# Patient Record
Sex: FEMALE | Race: WHITE | NOT HISPANIC OR LATINO | Employment: PART TIME | URBAN - METROPOLITAN AREA
[De-identification: names, ages, dates, MRNs, and addresses within clinical notes are randomized per-mention and may not be internally consistent; named-entity substitution may affect disease eponyms.]

---

## 2017-01-10 PROBLEM — I65.29 ASYMPTOMATIC CAROTID ARTERY STENOSIS WITHOUT INFARCTION: Status: ACTIVE | Noted: 2017-01-10

## 2017-02-20 PROBLEM — J30.1 SEASONAL ALLERGIC RHINITIS DUE TO POLLEN: Status: ACTIVE | Noted: 2017-02-20

## 2017-03-17 PROBLEM — N39.46 MIXED STRESS AND URGE URINARY INCONTINENCE: Status: ACTIVE | Noted: 2017-03-17

## 2017-10-10 PROBLEM — A41.9 SEVERE SEPSIS: Status: ACTIVE | Noted: 2017-10-10

## 2017-10-10 PROBLEM — R65.20 SEVERE SEPSIS: Status: ACTIVE | Noted: 2017-10-10

## 2017-10-10 PROBLEM — J18.9 BILATERAL PNEUMONIA: Status: ACTIVE | Noted: 2017-10-10

## 2017-10-10 PROBLEM — J80 ARDS (ADULT RESPIRATORY DISTRESS SYNDROME): Status: ACTIVE | Noted: 2017-10-10

## 2017-10-11 PROBLEM — L89.312 DECUBITUS ULCER OF RIGHT BUTTOCK, STAGE 2: Status: ACTIVE | Noted: 2017-10-11

## 2017-10-13 PROBLEM — K85.81 OTHER ACUTE PANCREATITIS WITH UNINFECTED NECROSIS: Status: ACTIVE | Noted: 2017-10-13

## 2017-10-15 PROBLEM — K85.81 OTHER ACUTE PANCREATITIS WITH UNINFECTED NECROSIS: Status: ACTIVE | Noted: 2017-10-15

## 2017-10-16 PROBLEM — R06.02 SOB (SHORTNESS OF BREATH): Status: ACTIVE | Noted: 2017-10-16

## 2017-10-17 PROBLEM — R41.0 DELIRIUM: Status: ACTIVE | Noted: 2017-10-17

## 2017-10-19 PROBLEM — K85.90 ACUTE PANCREATITIS: Status: ACTIVE | Noted: 2017-10-15

## 2017-10-19 PROBLEM — L89.312 DECUBITUS ULCER OF RIGHT BUTTOCK, STAGE 2: Status: RESOLVED | Noted: 2017-10-11 | Resolved: 2017-10-19

## 2017-10-19 PROBLEM — J13 PNEUMONIA OF BOTH LUNGS DUE TO STREPTOCOCCUS PNEUMONIAE: Status: ACTIVE | Noted: 2017-10-19

## 2017-10-19 PROBLEM — K85.81 OTHER ACUTE PANCREATITIS WITH UNINFECTED NECROSIS: Status: RESOLVED | Noted: 2017-10-15 | Resolved: 2017-10-19

## 2017-10-19 PROBLEM — L89.90 DECUBITAL ULCER: Status: ACTIVE | Noted: 2017-10-11

## 2017-10-19 PROBLEM — J80 ARDS (ADULT RESPIRATORY DISTRESS SYNDROME): Status: RESOLVED | Noted: 2017-10-10 | Resolved: 2017-10-19

## 2017-10-19 PROBLEM — R53.81 DEBILITY: Status: ACTIVE | Noted: 2017-10-19

## 2017-10-19 PROBLEM — R41.0 DELIRIUM: Status: RESOLVED | Noted: 2017-10-17 | Resolved: 2017-10-19

## 2017-10-19 PROBLEM — G72.2 TOXIC MYOPATHY: Status: ACTIVE | Noted: 2017-10-19

## 2017-10-19 PROBLEM — J18.9 BILATERAL PNEUMONIA: Status: RESOLVED | Noted: 2017-10-10 | Resolved: 2017-10-19

## 2017-10-20 PROBLEM — L89.92 PRESSURE ULCER, STAGE 2: Status: ACTIVE | Noted: 2017-10-20

## 2017-10-20 PROBLEM — J13 PNEUMONIA OF BOTH LUNGS DUE TO STREPTOCOCCUS PNEUMONIAE: Status: ACTIVE | Noted: 2017-10-20

## 2017-12-04 PROBLEM — I51.81 STRESS-INDUCED CARDIOMYOPATHY: Status: ACTIVE | Noted: 2017-12-04

## 2017-12-22 PROBLEM — I65.29 ASYMPTOMATIC CAROTID ARTERY STENOSIS WITHOUT INFARCTION: Status: RESOLVED | Noted: 2017-01-10 | Resolved: 2017-12-22

## 2017-12-22 PROBLEM — Z86.73 HISTORY OF TIA (TRANSIENT ISCHEMIC ATTACK): Status: ACTIVE | Noted: 2017-12-22

## 2017-12-22 PROBLEM — I27.20 PULMONARY HYPERTENSION: Status: ACTIVE | Noted: 2017-12-22

## 2017-12-22 PROBLEM — R00.1 BRADYCARDIA: Status: ACTIVE | Noted: 2017-12-22

## 2018-06-22 PROBLEM — I36.1 NON-RHEUMATIC TRICUSPID VALVE INSUFFICIENCY: Status: ACTIVE | Noted: 2018-06-22

## 2018-06-22 PROBLEM — R06.02 SOB (SHORTNESS OF BREATH): Status: RESOLVED | Noted: 2017-10-16 | Resolved: 2018-06-22

## 2018-06-22 PROBLEM — E78.5 DYSLIPIDEMIA: Status: ACTIVE | Noted: 2018-06-22

## 2020-01-15 PROBLEM — Z87.09 HISTORY OF ARDS: Status: ACTIVE | Noted: 2017-10-10

## 2020-01-28 ENCOUNTER — CLINICAL SUPPORT (OUTPATIENT)
Dept: SMOKING CESSATION | Facility: CLINIC | Age: 65
End: 2020-01-28
Payer: COMMERCIAL

## 2020-01-28 ENCOUNTER — TELEPHONE (OUTPATIENT)
Dept: SMOKING CESSATION | Facility: CLINIC | Age: 65
End: 2020-01-28

## 2020-01-28 DIAGNOSIS — F17.210 HEAVY CIGARETTE SMOKER (20-39 PER DAY): Primary | ICD-10-CM

## 2020-01-28 PROCEDURE — 99404 PR PREVENT COUNSEL,INDIV,60 MIN: ICD-10-PCS | Mod: S$GLB,,,

## 2020-01-28 PROCEDURE — 99404 PREV MED CNSL INDIV APPRX 60: CPT | Mod: S$GLB,,,

## 2020-01-28 RX ORDER — BUPROPION HYDROCHLORIDE 150 MG/1
TABLET, EXTENDED RELEASE ORAL
Qty: 60 TABLET | Refills: 0 | Status: ON HOLD | OUTPATIENT
Start: 2020-01-28 | End: 2022-10-28

## 2020-01-28 RX ORDER — IBUPROFEN 200 MG
1 TABLET ORAL DAILY
Qty: 14 PATCH | Refills: 0 | Status: SHIPPED | OUTPATIENT
Start: 2020-01-28 | End: 2021-02-09

## 2020-01-28 RX ORDER — VARENICLINE TARTRATE 0.5 (11)-1
KIT ORAL
Qty: 1 PACKAGE | Refills: 0 | Status: SHIPPED | OUTPATIENT
Start: 2020-01-28 | End: 2020-02-28

## 2020-01-28 NOTE — Clinical Note
Patient currently smoking 1.5 packs per day and will begin weekly tobacco cessation group meetings. Patient will begin prescribed tobacco cessation medication regimen of starter pack of Chantix, along with 14mg nicotine patch daily as directed.

## 2020-03-21 ENCOUNTER — OFFICE VISIT (OUTPATIENT)
Dept: URGENT CARE | Facility: CLINIC | Age: 65
End: 2020-03-21
Payer: MEDICARE

## 2020-03-21 ENCOUNTER — TELEPHONE (OUTPATIENT)
Dept: URGENT CARE | Facility: CLINIC | Age: 65
End: 2020-03-21

## 2020-03-21 VITALS
HEIGHT: 65 IN | DIASTOLIC BLOOD PRESSURE: 65 MMHG | RESPIRATION RATE: 19 BRPM | BODY MASS INDEX: 35.16 KG/M2 | SYSTOLIC BLOOD PRESSURE: 141 MMHG | HEART RATE: 52 BPM | WEIGHT: 211 LBS | TEMPERATURE: 97 F | OXYGEN SATURATION: 99 %

## 2020-03-21 DIAGNOSIS — R05.9 COUGH: Primary | ICD-10-CM

## 2020-03-21 DIAGNOSIS — F17.200 SMOKER: ICD-10-CM

## 2020-03-21 DIAGNOSIS — J40 BRONCHITIS: ICD-10-CM

## 2020-03-21 DIAGNOSIS — J02.9 ACUTE PHARYNGITIS, UNSPECIFIED ETIOLOGY: ICD-10-CM

## 2020-03-21 DIAGNOSIS — J44.1 COPD EXACERBATION: ICD-10-CM

## 2020-03-21 LAB
CTP QC/QA: YES
FLUAV AG NPH QL: NEGATIVE
FLUBV AG NPH QL: NEGATIVE

## 2020-03-21 PROCEDURE — 96372 THER/PROPH/DIAG INJ SC/IM: CPT | Mod: S$GLB,,, | Performed by: FAMILY MEDICINE

## 2020-03-21 PROCEDURE — 99203 OFFICE O/P NEW LOW 30 MIN: CPT | Mod: 25,S$GLB,, | Performed by: FAMILY MEDICINE

## 2020-03-21 PROCEDURE — 96372 PR INJECTION,THERAP/PROPH/DIAG2ST, IM OR SUBCUT: ICD-10-PCS | Mod: S$GLB,,, | Performed by: FAMILY MEDICINE

## 2020-03-21 PROCEDURE — 87804 INFLUENZA ASSAY W/OPTIC: CPT | Mod: QW,S$GLB,, | Performed by: FAMILY MEDICINE

## 2020-03-21 PROCEDURE — 99203 PR OFFICE/OUTPT VISIT, NEW, LEVL III, 30-44 MIN: ICD-10-PCS | Mod: 25,S$GLB,, | Performed by: FAMILY MEDICINE

## 2020-03-21 PROCEDURE — 87804 POCT INFLUENZA A/B: ICD-10-PCS | Mod: 59,QW,S$GLB, | Performed by: FAMILY MEDICINE

## 2020-03-21 PROCEDURE — U0002 COVID-19 LAB TEST NON-CDC: HCPCS

## 2020-03-21 RX ORDER — ALBUTEROL SULFATE 90 UG/1
2 AEROSOL, METERED RESPIRATORY (INHALATION) EVERY 4 HOURS PRN
Qty: 2 G | Refills: 0 | Status: SHIPPED | OUTPATIENT
Start: 2020-03-21 | End: 2020-10-05 | Stop reason: SDUPTHER

## 2020-03-21 RX ORDER — DEXAMETHASONE SODIUM PHOSPHATE 100 MG/10ML
5 INJECTION INTRAMUSCULAR; INTRAVENOUS
Status: COMPLETED | OUTPATIENT
Start: 2020-03-21 | End: 2020-03-21

## 2020-03-21 RX ORDER — PREDNISONE 20 MG/1
40 TABLET ORAL DAILY
Qty: 10 TABLET | Refills: 0 | Status: SHIPPED | OUTPATIENT
Start: 2020-03-21 | End: 2020-03-26

## 2020-03-21 RX ORDER — CEFDINIR 300 MG/1
300 CAPSULE ORAL 2 TIMES DAILY
Qty: 20 CAPSULE | Refills: 0 | Status: SHIPPED | OUTPATIENT
Start: 2020-03-21 | End: 2020-03-31

## 2020-03-21 RX ADMIN — DEXAMETHASONE SODIUM PHOSPHATE 5 MG: 100 INJECTION INTRAMUSCULAR; INTRAVENOUS at 10:03

## 2020-03-21 NOTE — TELEPHONE ENCOUNTER
Pharmacy called stating that the patient has an allergy to Penicillin and it has a cross sensitivity with the Cefdinir prescribed, and patient was scared to take it.  Per Dr. Andre, he stated it is ok to take the Cefdinir with a Penicillin allergy.

## 2020-03-21 NOTE — PROGRESS NOTES
"Subjective:       Patient ID: Ambika Becerra is a 64 y.o. female.    Vitals:  height is 5' 5" (1.651 m) and weight is 95.7 kg (211 lb). Her temperature is 97.1 °F (36.2 °C). Her blood pressure is 141/65 (abnormal) and her pulse is 52 (abnormal). Her respiration is 19 and oxygen saturation is 99%.     Chief Complaint: Cough    Cough   Episode onset: 03/20/2020. The problem has been gradually worsening. The problem occurs constantly. The cough is productive of sputum. Associated symptoms include chills, ear pain, myalgias, postnasal drip, a sore throat and wheezing. Pertinent negatives include no eye redness, fever, hemoptysis, rash or shortness of breath. The symptoms are aggravated by lying down. Treatments tried: sudafed, tylenol. Her past medical history is significant for bronchitis, COPD and pneumonia.       Constitution: Positive for appetite change, chills and sweating. Negative for activity change, fatigue, fever, unexpected weight change, generalized weakness and international travel in last 60 days.   HENT: Positive for ear pain, postnasal drip, sinus pain, sinus pressure, sore throat and voice change. Negative for congestion.    Neck: Negative for painful lymph nodes.   Eyes: Negative for eye redness.   Respiratory: Positive for chest tightness, cough, sputum production, COPD and wheezing. Negative for bloody sputum, shortness of breath, stridor and asthma.    Gastrointestinal: Negative for nausea and vomiting.   Musculoskeletal: Positive for muscle ache.   Skin: Negative for rash.   Allergic/Immunologic: Positive for immunizations up-to-date. Negative for seasonal allergies, asthma and flu shot.   Hematologic/Lymphatic: Negative for swollen lymph nodes.       Objective:      Physical Exam   Constitutional: She is oriented to person, place, and time. She appears well-developed and well-nourished. She is cooperative.  Non-toxic appearance. She does not have a sickly appearance. She does not appear ill. " No distress.   HENT:   Head: Normocephalic and atraumatic.   Right Ear: Hearing, tympanic membrane, external ear and ear canal normal.   Left Ear: Hearing, tympanic membrane, external ear and ear canal normal.   Nose: No mucosal edema, rhinorrhea or nasal deformity. No epistaxis. Right sinus exhibits no maxillary sinus tenderness and no frontal sinus tenderness. Left sinus exhibits no maxillary sinus tenderness and no frontal sinus tenderness.   Mouth/Throat: Uvula is midline and mucous membranes are normal. No trismus in the jaw. Normal dentition. No uvula swelling. Posterior oropharyngeal edema and posterior oropharyngeal erythema present. No oropharyngeal exudate.   Eyes: Conjunctivae and lids are normal. No scleral icterus.   Neck: Trachea normal, full passive range of motion without pain and phonation normal. Neck supple. No neck rigidity. No edema and no erythema present.   Cardiovascular: Normal rate, regular rhythm, normal heart sounds, intact distal pulses and normal pulses.   Pulmonary/Chest: Effort normal and breath sounds normal. No respiratory distress. She has no decreased breath sounds. She has no rhonchi.   Abdominal: Normal appearance.   Musculoskeletal: Normal range of motion. She exhibits no edema or deformity.   Neurological: She is alert and oriented to person, place, and time. She exhibits normal muscle tone. Coordination normal.   Skin: Skin is warm, dry, intact, not diaphoretic and not pale.   Psychiatric: She has a normal mood and affect. Her speech is normal and behavior is normal. Judgment and thought content normal. Cognition and memory are normal.   Nursing note and vitals reviewed.        Assessment:       1. Cough    2. Acute pharyngitis, unspecified etiology    3. Bronchitis    4. COPD exacerbation    5. Smoker        Plan:         Cough  -     POCT Influenza A/B  -     SARS- CoV-2 (COVID-19) QUALITATIVE PCR    Acute pharyngitis, unspecified etiology    Bronchitis    COPD  exacerbation  -     dexamethasone injection 5 mg  -     cefdinir (OMNICEF) 300 MG capsule; Take 1 capsule (300 mg total) by mouth 2 (two) times daily. for 10 days  Dispense: 20 capsule; Refill: 0  -     dexchlorpheniramin-pseudoephed (RESCON) 2-60 mg Tab; Take 1 tablet by mouth 2 (two) times daily as needed.  Dispense: 20 tablet; Refill: 0  -     albuterol (PROVENTIL/VENTOLIN HFA) 90 mcg/actuation inhaler; Inhale 2 puffs into the lungs every 4 (four) hours as needed for Wheezing. Rescue  Dispense: 2 g; Refill: 0  -     predniSONE (DELTASONE) 20 MG tablet; Take 2 tablets (40 mg total) by mouth once daily. for 5 days  Dispense: 10 tablet; Refill: 0    Smoker  -     Ambulatory referral/consult to Smoking Cessation Program     Please drink plenty of fluids.  Please get plenty of rest.  Please return here or go to the Emergency Department for any concerns or worsening of condition.  If you were given wait & see antibiotics, please wait 3-5 days before taking them, and only take them if your symptoms have worsened or not improved.  If you do begin taking the antibiotics, please take them to completion.  If you were prescribed antibiotics, please take them to completion.  If you were prescribed a narcotic medication, do not drive or operate heavy equipment or machinery while taking these medications.    You were given a decongestant (RESCON or POLY VENT Dm).  If your insurance does not cover it or you cannot afford it, it is ok to use the over the counter products listed below.  If you do not have Hypertension or any history of palpitations, it is ok to take over the counter Sudafed or Mucinex D or Allegra-D or Claritin-D or Zyrtec-D.  If you do take one of the above, it is ok to combine that with plain over the counter Mucinex or Allegra or Claritin or Zyrtec.  If for example you are taking Zyrtec -D, you can combine that with Mucinex, but not Mucinex-D.  If you are taking Mucinex-D, you can combine that with plain  Allegra or Claritin or Zyrtec.   If you do have Hypertension or palpitations, it is safe to take Coricidin HBP for relief of sinus symptoms.    We recommend you take over the counter Flonase (Fluticasone) or another nasally inhaled steroid unless you are already taking one.  Nasal irrigation with a saline spray or Netti Pot like device per their directions is also recommended.  If not allergic, please take over the counter Tylenol (Acetaminophen) and/or Motrin (Ibuprofen) as directed for control of pain and/or fever.    Robitussin DM 2 teas every 4 hours as needed for cough.  If you  smoke, please stop smoking.    You were given an injection of steroid.  This will relieve swelling and inflammation and improve respiratory symptoms.  It can raise your blood sugar if you are diabetic.    Please follow up with your primary care doctor or specialist as needed.  Jose Love MD  [phone]    You must understand that you have received treatment at an Urgent Care facility only, and that you may be  released before all of your medical problems are known or treated. Urgent Care facilities are not equipped to  handle life threatening emergencies. It is recommended that you seek care at an Emergency Department for  further evaluation of worsening or concerning symptoms, or possibly life threatening conditions as  discussed.

## 2020-03-21 NOTE — LETTER
March 21, 2020  Ambika Becerra  [address]  [place] [address]                [facility]  [address], [address]  [place] [address]  Phone: [phone]  Fax: [phone] Ambika Becerra was seen and treated in our Urgent Care department on 3/21/2020. She may return to work in 2 - 3 days.      If you have any questions or concerns, please don't hesitate to call.        Sincerely,        Williams Andre MD

## 2020-03-23 ENCOUNTER — TELEPHONE (OUTPATIENT)
Dept: URGENT CARE | Facility: CLINIC | Age: 65
End: 2020-03-23

## 2020-03-23 LAB — SARS-COV-2 RNA RESP QL NAA+PROBE: NOT DETECTED

## 2020-03-23 NOTE — TELEPHONE ENCOUNTER
Patient feeling better.  Informed patient test was NEGATIVE for COVID-19 (coronavirus).  Instructed patient to continue to stay home unless it is absolutely necessary to go to work or get necessities, to practice social distancing, cover cough, proper hand washing technique, and other recommended precautions to minimize the risk of spread.

## 2020-07-27 ENCOUNTER — TELEPHONE (OUTPATIENT)
Dept: SMOKING CESSATION | Facility: CLINIC | Age: 65
End: 2020-07-27

## 2020-07-27 NOTE — TELEPHONE ENCOUNTER
1st attempt unable to leave message regarding smoking cessation quit 1 episode phone follow up Mailbox full.

## 2021-02-01 ENCOUNTER — TELEPHONE (OUTPATIENT)
Dept: SMOKING CESSATION | Facility: CLINIC | Age: 66
End: 2021-02-01

## 2021-07-01 ENCOUNTER — PATIENT MESSAGE (OUTPATIENT)
Dept: ADMINISTRATIVE | Facility: OTHER | Age: 66
End: 2021-07-01

## 2022-10-21 PROBLEM — Z22.322 MRSA NASAL COLONIZATION: Status: ACTIVE | Noted: 2022-10-21

## 2022-10-22 PROBLEM — D64.89 OTHER SPECIFIED ANEMIAS: Status: ACTIVE | Noted: 2022-10-22

## 2022-10-22 PROBLEM — D64.9 ANEMIA: Status: ACTIVE | Noted: 2022-10-22

## 2022-10-24 PROBLEM — J96.01 ACUTE HYPOXEMIC RESPIRATORY FAILURE: Status: ACTIVE | Noted: 2022-10-24

## 2022-10-24 PROBLEM — G93.41 ACUTE METABOLIC ENCEPHALOPATHY: Status: ACTIVE | Noted: 2022-10-24

## 2022-10-24 PROBLEM — J98.4 CAVITARY PNEUMONIA: Status: ACTIVE | Noted: 2017-10-10

## 2022-10-24 PROBLEM — F33.0 MDD (MAJOR DEPRESSIVE DISORDER), RECURRENT EPISODE, MILD: Status: ACTIVE | Noted: 2022-10-24

## 2022-10-24 PROBLEM — D72.829 LEUKOCYTOSIS: Status: ACTIVE | Noted: 2022-10-24

## 2022-10-24 PROBLEM — I07.1 SEVERE TRICUSPID REGURGITATION: Status: ACTIVE | Noted: 2022-10-24

## 2022-10-24 PROBLEM — Z79.899 POLYPHARMACY: Status: ACTIVE | Noted: 2022-10-24

## 2022-10-24 PROBLEM — E87.6 HYPOKALEMIA: Status: ACTIVE | Noted: 2022-10-24

## 2022-10-24 PROBLEM — R74.01 TRANSAMINASEMIA: Status: ACTIVE | Noted: 2022-10-24

## 2022-10-24 PROBLEM — E03.9 HYPOTHYROID: Status: ACTIVE | Noted: 2022-10-24

## 2022-10-24 PROBLEM — I50.33 ACUTE ON CHRONIC HEART FAILURE WITH PRESERVED EJECTION FRACTION (HFPEF): Status: ACTIVE | Noted: 2022-10-24

## 2022-10-25 PROBLEM — I07.1 SEVERE TRICUSPID REGURGITATION: Status: RESOLVED | Noted: 2022-10-24 | Resolved: 2022-10-25

## 2022-10-25 PROBLEM — L89.92 PRESSURE ULCER, STAGE 2: Status: RESOLVED | Noted: 2017-10-20 | Resolved: 2022-10-25

## 2022-10-27 PROBLEM — E63.9 INADEQUATE DIETARY ENERGY INTAKE: Status: ACTIVE | Noted: 2022-10-27

## 2022-12-07 PROBLEM — I34.0 NONRHEUMATIC MITRAL VALVE REGURGITATION: Status: ACTIVE | Noted: 2022-12-07

## 2022-12-07 PROBLEM — I50.33 ACUTE ON CHRONIC HEART FAILURE WITH PRESERVED EJECTION FRACTION (HFPEF): Status: RESOLVED | Noted: 2022-10-24 | Resolved: 2022-12-07

## 2022-12-07 PROBLEM — R06.09 DYSPNEA ON EXERTION: Status: ACTIVE | Noted: 2022-12-07

## 2022-12-07 PROBLEM — Z72.0 TOBACCO ABUSE: Status: ACTIVE | Noted: 2022-12-07

## 2022-12-07 PROBLEM — E66.09 CLASS 2 OBESITY DUE TO EXCESS CALORIES WITHOUT SERIOUS COMORBIDITY WITH BODY MASS INDEX (BMI) OF 38.0 TO 38.9 IN ADULT: Status: ACTIVE | Noted: 2022-12-07

## 2022-12-07 PROBLEM — Z91.89 MULTIPLE RISK FACTORS FOR CORONARY ARTERY DISEASE: Status: ACTIVE | Noted: 2022-12-07

## 2022-12-07 PROBLEM — I27.20 PULMONARY HYPERTENSION: Status: ACTIVE | Noted: 2022-12-07

## 2022-12-07 PROBLEM — I51.81 TAKOTSUBO CARDIOMYOPATHY: Status: ACTIVE | Noted: 2022-12-07

## 2022-12-07 PROBLEM — I51.89 DIASTOLIC DYSFUNCTION: Status: ACTIVE | Noted: 2022-12-07

## 2022-12-07 PROBLEM — R00.1 BRADYCARDIA: Status: RESOLVED | Noted: 2017-12-22 | Resolved: 2022-12-07

## 2022-12-07 PROBLEM — R09.89 BILATERAL CAROTID BRUITS: Status: ACTIVE | Noted: 2022-12-07

## 2022-12-07 PROBLEM — I51.7 LEFT ATRIAL ENLARGEMENT: Status: ACTIVE | Noted: 2022-12-07

## 2023-01-23 PROBLEM — J96.01 ACUTE HYPOXEMIC RESPIRATORY FAILURE: Status: RESOLVED | Noted: 2022-10-24 | Resolved: 2023-01-23

## 2023-01-23 PROBLEM — J98.4 CAVITARY PNEUMONIA: Status: RESOLVED | Noted: 2017-10-10 | Resolved: 2023-01-23

## 2023-01-23 PROBLEM — J18.9 CAVITARY PNEUMONIA: Status: RESOLVED | Noted: 2017-10-10 | Resolved: 2023-01-23

## 2023-03-19 ENCOUNTER — OFFICE VISIT (OUTPATIENT)
Dept: URGENT CARE | Facility: CLINIC | Age: 68
End: 2023-03-19
Payer: MEDICARE

## 2023-03-19 VITALS
OXYGEN SATURATION: 97 % | SYSTOLIC BLOOD PRESSURE: 138 MMHG | HEART RATE: 70 BPM | DIASTOLIC BLOOD PRESSURE: 85 MMHG | RESPIRATION RATE: 20 BRPM | WEIGHT: 218 LBS | BODY MASS INDEX: 36.32 KG/M2 | TEMPERATURE: 97 F | HEIGHT: 65 IN

## 2023-03-19 DIAGNOSIS — R05.3 CHRONIC COUGH: ICD-10-CM

## 2023-03-19 DIAGNOSIS — M25.511 ACUTE PAIN OF BOTH SHOULDERS: Primary | ICD-10-CM

## 2023-03-19 DIAGNOSIS — R19.7 DIARRHEA, UNSPECIFIED TYPE: ICD-10-CM

## 2023-03-19 DIAGNOSIS — M25.512 ACUTE PAIN OF BOTH SHOULDERS: Primary | ICD-10-CM

## 2023-03-19 PROCEDURE — 99213 PR OFFICE/OUTPT VISIT, EST, LEVL III, 20-29 MIN: ICD-10-PCS | Mod: S$GLB,,, | Performed by: PHYSICIAN ASSISTANT

## 2023-03-19 PROCEDURE — 99213 OFFICE O/P EST LOW 20 MIN: CPT | Mod: S$GLB,,, | Performed by: PHYSICIAN ASSISTANT

## 2023-03-19 RX ORDER — METHOCARBAMOL 500 MG/1
1000 TABLET, FILM COATED ORAL 3 TIMES DAILY PRN
Qty: 18 TABLET | Refills: 0 | Status: SHIPPED | OUTPATIENT
Start: 2023-03-19 | End: 2023-03-22

## 2023-03-19 NOTE — PROGRESS NOTES
"Subjective:       Patient ID: Ambika Becerra is a 67 y.o. female.    Vitals:  height is 5' 5" (1.651 m) and weight is 98.9 kg (218 lb). Her tympanic temperature is 96.9 °F (36.1 °C). Her blood pressure is 138/85 and her pulse is 70. Her respiration is 20 and oxygen saturation is 97%.     Chief Complaint: Cough    Reports bilateral shoulder pain. Denies trauma or injury. Reports relief with lidocaine patches and tylenol. Worse with movement and certain positions. Denies chest pain, SOB, fever or chills. Reports cough as her inhalers wear off. Improvement after taking her meds. States episode of diarrhea this morning so she called into work. Normal BM after that today.     Cough  This is a new problem. The current episode started in the past 7 days. The problem has been unchanged. The problem occurs every few hours. Cough characteristics: green. Associated symptoms include nasal congestion and rhinorrhea (yesterday). Pertinent negatives include no chills, ear pain, fever, headaches, postnasal drip, sore throat or shortness of breath. Exacerbated by: morning. Treatments tried: tylenol. The treatment provided mild relief. Her past medical history is significant for COPD.     Constitution: Negative for chills and fever.   HENT:  Negative for ear pain, postnasal drip and sore throat.    Respiratory:  Positive for cough. Negative for shortness of breath.    Gastrointestinal:  Positive for diarrhea. Negative for abdominal pain, nausea, vomiting and constipation.   Musculoskeletal:  Positive for pain and joint pain. Negative for trauma and joint swelling.   Neurological:  Negative for headaches, numbness and tingling.     Objective:      Physical Exam   Constitutional: She is oriented to person, place, and time. She appears well-developed. She is cooperative.  Non-toxic appearance. She does not appear ill. No distress.   HENT:   Head: Normocephalic and atraumatic.   Ears:   Right Ear: Hearing normal.   Left Ear: Hearing " normal.   Eyes: Conjunctivae and lids are normal. No scleral icterus.   Neck: Phonation normal. Neck supple.   Cardiovascular: Normal rate, regular rhythm, normal heart sounds and normal pulses.   No murmur heard.Exam reveals no gallop and no friction rub.   Pulmonary/Chest: Effort normal and breath sounds normal. No stridor. No respiratory distress. She has no wheezes. She has no rhonchi. She has no rales.   Speaking in clear full sentences, breathing unlabored. No cough on exam         Comments: Speaking in clear full sentences, breathing unlabored. No cough on exam    Abdominal: Normal appearance. She exhibits no distension. Soft. There is no abdominal tenderness. There is no left CVA tenderness and no right CVA tenderness.   Musculoskeletal:      Comments: No midline TTP or step offs to cervical, thoracic or lumbar spine. No paraspinal muscle TTP. FROM of spine without discomfort or pain. No signs of trauma or injury.   Full range of motion bilateral upper and lower extremities. Strength 5/5.  Intact distal pulses with no sensory deficits.  Capillary refill less than 3 sec.  No signs of trauma or injury. No ecchymosis, edema, erythema, abrasions or lacerations.  Reports pain with certain positions. No bony deformity or bony TTP to either shoulder. No overlying skin changes   Neurological: She is alert and oriented to person, place, and time. Coordination normal.   Skin: Skin is intact, not diaphoretic and not pale.   Psychiatric: Her speech is normal and behavior is normal. Judgment and thought content normal.   Nursing note and vitals reviewed.      Assessment:       1. Acute pain of both shoulders    2. Chronic cough    3. Diarrhea, unspecified type          Plan:         Acute pain of both shoulders  -     methocarbamoL (ROBAXIN) 500 MG Tab; Take 2 tablets (1,000 mg total) by mouth 3 (three) times daily as needed (shoulder pain).  Dispense: 18 tablet; Refill: 0    Chronic cough    Diarrhea, unspecified  type    Continue current treatment at home, rest, increase po fluids. Return PRN. Discussed with patient the importance of f/u with their primary care provider. Urged to go to the ER for any worsening signs or symptoms.

## 2023-03-19 NOTE — LETTER
March 19, 2023      [facility]  [address], [address]  [place] [address]  Phone: [phone]  Fax: [phone]       Patient: Ambika Becerra   YOB: 1955  Date of Visit: 03/19/2023    To Whom It May Concern:    Maynor Becerra  was at [facility] on 03/19/2023. The patient may return to work/school in 1-2 days as long as symptoms improve and she is fever free with no restrictions. If you have any questions or concerns, or if I can be of further assistance, please do not hesitate to contact me.    Sincerely,    Nathalia Oviedo PA-C

## 2023-11-17 PROBLEM — J15.212 PNEUMONIA DUE TO METHICILLIN RESISTANT STAPHYLOCOCCUS AUREUS: Status: ACTIVE | Noted: 2022-10-28

## 2023-11-17 PROBLEM — I50.33 ACUTE ON CHRONIC DIASTOLIC (CONGESTIVE) HEART FAILURE: Status: ACTIVE | Noted: 2022-10-28

## 2024-02-19 PROBLEM — J15.212 PNEUMONIA DUE TO METHICILLIN RESISTANT STAPHYLOCOCCUS AUREUS: Status: RESOLVED | Noted: 2022-10-28 | Resolved: 2024-02-19
